# Patient Record
Sex: FEMALE | Race: WHITE | Employment: OTHER | URBAN - METROPOLITAN AREA
[De-identification: names, ages, dates, MRNs, and addresses within clinical notes are randomized per-mention and may not be internally consistent; named-entity substitution may affect disease eponyms.]

---

## 2022-06-02 NOTE — PROGRESS NOTES
ENDOSCOPY PREOP INSTRUCTIONS       You are scheduled for a procedure at [facility] on 6-3 @ 800.  You will need to arrival by: 630 (at least an hour & a half prior to planned start time)   Report to the MAIN entrance on [address] and register at the information desk on the left-hand side of the lobby   You will need your insurance & photo ID with you. For your procedure:      PLEASE FOLLOW ALL INSTRUCTIONS & PREPS GIVEN TO YOU FROM YOUR DOCTOR'S OFFICE.  If you have not received these instructions yet, please call the office immediately. Make sure to read them as soon as received.  Bring an accurate list of any medications, including the dose/ frequency, with you on the day of the procedure. Make sure to include over the counter medications.  If you are taking blood thinners, Aspirin or diabetic medication, make sure to call your doctor as soon as possible for instructions prior to your procedure.  Please dress comfortably and do not wear any lotion, powders or jewelry   Arrange for someone to be with you and sign you out & drive you home after your procedure.  We allow 2 visitors with you in the hospital & both of you are required to be masked.  WOMEN ONLY OF CHILDBEARING AGE: Please make sure to be able to give a urine sample on arrival      If you have further questions, you may contact your Endoscopist's office or Pre Admission Testing staff at [phone] [facility]. 6/2/2022 .3:09 PM

## 2022-06-03 ENCOUNTER — HOSPITAL ENCOUNTER (OUTPATIENT)
Age: 67
Setting detail: OUTPATIENT SURGERY
Discharge: HOME | End: 2022-06-03
Attending: INTERNAL MEDICINE | Admitting: INTERNAL MEDICINE
Payer: MEDICARE

## 2022-06-03 ENCOUNTER — ANESTHESIA EVENT (OUTPATIENT)
Dept: ENDOSCOPY | Age: 67
End: 2022-06-03
Payer: MEDICARE

## 2022-06-03 ENCOUNTER — ANESTHESIA (OUTPATIENT)
Dept: ENDOSCOPY | Age: 67
End: 2022-06-03
Payer: MEDICARE

## 2022-06-03 VITALS
TEMPERATURE: 96.9 F | RESPIRATION RATE: 18 BRPM | OXYGEN SATURATION: 99 % | HEART RATE: 65 BPM | HEIGHT: 65 IN | BODY MASS INDEX: 34.99 KG/M2 | WEIGHT: 210 LBS | SYSTOLIC BLOOD PRESSURE: 111 MMHG | DIASTOLIC BLOOD PRESSURE: 45 MMHG

## 2022-06-03 DIAGNOSIS — Z86.010 HISTORY OF COLON POLYPS: ICD-10-CM

## 2022-06-03 PROCEDURE — 3700000001 HC ADD 15 MINUTES (ANESTHESIA): Performed by: INTERNAL MEDICINE

## 2022-06-03 PROCEDURE — 7100000011 HC PHASE II RECOVERY - ADDTL 15 MIN: Performed by: INTERNAL MEDICINE

## 2022-06-03 PROCEDURE — 2580000003 HC RX 258: Performed by: ANESTHESIOLOGY

## 2022-06-03 PROCEDURE — 7100000010 HC PHASE II RECOVERY - FIRST 15 MIN: Performed by: INTERNAL MEDICINE

## 2022-06-03 PROCEDURE — 88305 TISSUE EXAM BY PATHOLOGIST: CPT

## 2022-06-03 PROCEDURE — 2709999900 HC NON-CHARGEABLE SUPPLY: Performed by: INTERNAL MEDICINE

## 2022-06-03 PROCEDURE — 6360000002 HC RX W HCPCS: Performed by: NURSE ANESTHETIST, CERTIFIED REGISTERED

## 2022-06-03 PROCEDURE — 3609010600 HC COLONOSCOPY POLYPECTOMY SNARE/COLD BIOPSY: Performed by: INTERNAL MEDICINE

## 2022-06-03 PROCEDURE — 3700000000 HC ANESTHESIA ATTENDED CARE: Performed by: INTERNAL MEDICINE

## 2022-06-03 RX ORDER — ASPIRIN 81 MG/1
81 TABLET ORAL DAILY
COMMUNITY
Start: 2018-05-22

## 2022-06-03 RX ORDER — DULOXETIN HYDROCHLORIDE 30 MG/1
CAPSULE, DELAYED RELEASE ORAL DAILY
COMMUNITY
Start: 2022-04-18

## 2022-06-03 RX ORDER — PROPOFOL 10 MG/ML
INJECTION, EMULSION INTRAVENOUS PRN
Status: DISCONTINUED | OUTPATIENT
Start: 2022-06-03 | End: 2022-06-03 | Stop reason: SDUPTHER

## 2022-06-03 RX ORDER — LIDOCAINE HYDROCHLORIDE 20 MG/ML
INJECTION, SOLUTION INTRAVENOUS PRN
Status: DISCONTINUED | OUTPATIENT
Start: 2022-06-03 | End: 2022-06-03 | Stop reason: SDUPTHER

## 2022-06-03 RX ORDER — SODIUM CHLORIDE 0.9 % (FLUSH) 0.9 %
5-40 SYRINGE (ML) INJECTION PRN
Status: DISCONTINUED | OUTPATIENT
Start: 2022-06-03 | End: 2022-06-03 | Stop reason: HOSPADM

## 2022-06-03 RX ORDER — SACUBITRIL AND VALSARTAN 49; 51 MG/1; MG/1
TABLET, FILM COATED ORAL DAILY
COMMUNITY
Start: 2021-04-09

## 2022-06-03 RX ORDER — LIDOCAINE HYDROCHLORIDE 10 MG/ML
1 INJECTION, SOLUTION EPIDURAL; INFILTRATION; INTRACAUDAL; PERINEURAL
Status: DISCONTINUED | OUTPATIENT
Start: 2022-06-03 | End: 2022-06-03 | Stop reason: HOSPADM

## 2022-06-03 RX ORDER — ACETAMINOPHEN 325 MG/1
650 TABLET ORAL EVERY 4 HOURS PRN
COMMUNITY
Start: 2017-05-03

## 2022-06-03 RX ORDER — SODIUM CHLORIDE 0.9 % (FLUSH) 0.9 %
5-40 SYRINGE (ML) INJECTION EVERY 12 HOURS SCHEDULED
Status: DISCONTINUED | OUTPATIENT
Start: 2022-06-03 | End: 2022-06-03 | Stop reason: HOSPADM

## 2022-06-03 RX ORDER — FUROSEMIDE 20 MG/1
TABLET ORAL
COMMUNITY
Start: 2021-07-06

## 2022-06-03 RX ORDER — NITROGLYCERIN 0.4 MG/1
TABLET SUBLINGUAL
COMMUNITY
Start: 2022-02-22

## 2022-06-03 RX ORDER — CYCLOSPORINE 0.5 MG/ML
1 EMULSION OPHTHALMIC EVERY MORNING
COMMUNITY

## 2022-06-03 RX ORDER — SODIUM CHLORIDE, SODIUM LACTATE, POTASSIUM CHLORIDE, CALCIUM CHLORIDE 600; 310; 30; 20 MG/100ML; MG/100ML; MG/100ML; MG/100ML
INJECTION, SOLUTION INTRAVENOUS CONTINUOUS
Status: DISCONTINUED | OUTPATIENT
Start: 2022-06-03 | End: 2022-06-03 | Stop reason: HOSPADM

## 2022-06-03 RX ORDER — M-VIT,TX,IRON,MINS/CALC/FOLIC 27MG-0.4MG
1 TABLET ORAL DAILY
COMMUNITY

## 2022-06-03 RX ORDER — SODIUM CHLORIDE 9 MG/ML
INJECTION, SOLUTION INTRAVENOUS PRN
Status: DISCONTINUED | OUTPATIENT
Start: 2022-06-03 | End: 2022-06-03 | Stop reason: HOSPADM

## 2022-06-03 RX ORDER — METOPROLOL SUCCINATE 100 MG/1
TABLET, EXTENDED RELEASE ORAL DAILY
COMMUNITY
Start: 2022-03-10

## 2022-06-03 RX ORDER — ROSUVASTATIN CALCIUM 5 MG/1
2.5 TABLET, COATED ORAL DAILY
COMMUNITY
Start: 2022-05-11

## 2022-06-03 RX ORDER — ALBUTEROL SULFATE 90 UG/1
2 AEROSOL, METERED RESPIRATORY (INHALATION) EVERY 6 HOURS PRN
COMMUNITY
Start: 2021-01-21

## 2022-06-03 RX ORDER — SPIRONOLACTONE 25 MG/1
TABLET ORAL DAILY
COMMUNITY
Start: 2022-03-10

## 2022-06-03 RX ADMIN — PROPOFOL 30 MG: 10 INJECTION, EMULSION INTRAVENOUS at 08:15

## 2022-06-03 RX ADMIN — SODIUM CHLORIDE, POTASSIUM CHLORIDE, SODIUM LACTATE AND CALCIUM CHLORIDE: 600; 310; 30; 20 INJECTION, SOLUTION INTRAVENOUS at 07:06

## 2022-06-03 RX ADMIN — PROPOFOL 40 MG: 10 INJECTION, EMULSION INTRAVENOUS at 08:19

## 2022-06-03 RX ADMIN — PROPOFOL 50 MG: 10 INJECTION, EMULSION INTRAVENOUS at 08:11

## 2022-06-03 RX ADMIN — LIDOCAINE HYDROCHLORIDE 60 MG: 20 INJECTION, SOLUTION INTRAVENOUS at 08:11

## 2022-06-03 RX ADMIN — PROPOFOL 30 MG: 10 INJECTION, EMULSION INTRAVENOUS at 08:24

## 2022-06-03 RX ADMIN — PROPOFOL 140 MCG/KG/MIN: 10 INJECTION, EMULSION INTRAVENOUS at 08:11

## 2022-06-03 ASSESSMENT — PAIN - FUNCTIONAL ASSESSMENT: PAIN_FUNCTIONAL_ASSESSMENT: 0-10

## 2022-06-03 ASSESSMENT — PAIN SCALES - GENERAL
PAINLEVEL_OUTOF10: 0

## 2022-06-03 NOTE — H&P
[facility] ENDOSCOPY  Outpatient Procedure H&P    Patient: Margaux Franklin MRN: [id number]     YOB: 1955  Age: 78 y.o. Sex: female    Unit: [facility] ENDOSCOPY Room/Bed: Endo Pool/NONE Location: [facility]     Procedure: Procedure(s):  COLONOSCOPY    Indication: History of colon polyps [Z86.010]    Referring  Physician:          Nurses past medical history notes reviewed and agreed. Medications reviewed.     Allergies: Latex, Colesevelam hcl, Polyethylene glycol, Ezetimibe, Lyrica [pregabalin], Penicillins, Rice, Flaxseed oil [bio-flax], Omega oil blend [docosahexaenoic acid (dha)], Percocet [oxycodone-acetaminophen], and Perflutren lipid microsphere     Allergies noted: Yes     Past Medical History:   Past Medical History:   Diagnosis Date    Agent orange exposure up to age 22    [place] Allergic rhinitis     Benign tumor of breast     Depression     Fibromyalgia     Heart attack ([facility] [address] 04/29/2017    Heart failure (HCC)     Meniere disease     Meniere's disease     Mitral valve prolapse     Rheumatoid arthritis(714.0)     Sinusitis     Vertigo        Past Surgical History:   Past Surgical History:   Procedure Laterality Date    CARDIAC DEFIBRILLATOR PLACEMENT Left     CORONARY ANGIOPLASTY WITH STENT PLACEMENT      one stent    HYSTERECTOMY  01/01/1991    KNEE ARTHROSCOPY  01/01/2008    right    KNEE ARTHROSCOPY Right 01/01/1996    MASTOID SURGERY  10/15/2004    Mastoid Shunt       Social History:   Social History     Socioeconomic History    Marital status:      Spouse name: Not on file    Number of children: Not on file    Years of education: Not on file    Highest education level: Not on file   Occupational History    Not on file   Tobacco Use    Smoking status: Current Every Day Smoker     Packs/day: 0.80     Years: 30.00     Pack years: 24.00     Types: Cigarettes    Smokeless tobacco: Never Used   Substance and Sexual Activity    Alcohol use: Yes     Alcohol/week: 3.3 standard drinks     Types: 4 drink(s) per week    Drug use: No    Sexual activity: Yes     Partners: Male   Other Topics Concern    Not on file   Social History Narrative    Not on file     Social Determinants of Health     Financial Resource Strain:     Difficulty of Paying Living Expenses: Not on file   Food Insecurity:     Worried About Running Out of Food in the Last Year: Not on file    Mohan of Food in the Last Year: Not on file   Transportation Needs:     Lack of Transportation (Medical): Not on file    Lack of Transportation (Non-Medical): Not on file   Physical Activity:     Days of Exercise per Week: Not on file    Minutes of Exercise per Session: Not on file   Stress:     Feeling of Stress : Not on file   Social Connections:     Frequency of Communication with Friends and Family: Not on file    Frequency of Social Gatherings with Friends and Family: Not on file    Attends Yazidism Services: Not on file    Active Member of [address] [place] [address] [employer] or Organizations: Not on file    Attends Club or Organization Meetings: Not on file    Marital Status: Not on file   Intimate Partner Violence:     Fear of Current or Ex-Partner: Not on file    Emotionally Abused: Not on file    Physically Abused: Not on file    Sexually Abused: Not on file   Housing Stability:     Unable to Pay for Housing in the Last Year: Not on file    Number of [place] in the Last Year: Not on file    Unstable Housing in the Last Year: Not on file       Family History:   Family History   Problem Relation Age of Onset    Heart Attack Father     Colon Cancer Mother     Uterine Cancer Maternal Grandmother     Breast Cancer Maternal Aunt        Home Medications:   Prior to Admission medications    Medication Sig Start Date End Date Taking?  Authorizing Provider   rosuvastatin (CRESTOR) 5 MG tablet Take 2.5 mg by mouth daily 5/11/22  Yes Historical Provider, MD   DULoxetine (CYMBALTA) 30 MG extended release capsule daily 4/18/22  Yes Historical Provider, MD   nitroGLYCERIN (NITROSTAT) 0.4 MG SL tablet TAKE 1 TABLET BY SUBLINGUAL ROUTE EVERY 5 MINUTES AS NEEDED. 2/22/22  Yes Historical Provider, MD   empagliflozin (JARDIANCE) 25 MG tablet Take 25 mg by mouth daily 8/27/21  Yes Historical Provider, MD   metoprolol succinate (TOPROL XL) 100 MG extended release tablet daily 3/10/22  Yes Historical Provider, MD   furosemide (LASIX) 20 MG tablet TAKE 1 TABLET BY MOUTH EVERY DAY 7/6/21  Yes Historical Provider, MD   spironolactone (ALDACTONE) 25 MG tablet daily 3/10/22  Yes Historical Provider, MD   albuterol sulfate  (90 Base) MCG/ACT inhaler Inhale 2 puffs into the lungs every 6 hours as needed 1/21/21  Yes Historical Provider, MD   cycloSPORINE (RESTASIS) 0.05 % ophthalmic emulsion Inject 1 drop into the eye every morning   Yes Historical Provider, MD   Magnesium Oxide (DIASENSE MAGNESIUM PO) Take 400 mg by mouth daily 5/8/19  Yes Historical Provider, MD   aspirin 81 MG EC tablet Take 81 mg by mouth daily 5/22/18  Yes Historical Provider, MD   Multiple Vitamins-Minerals (THERAPEUTIC MULTIVITAMIN-MINERALS) tablet Take 1 tablet by mouth daily   Yes Historical Provider, MD   acetaminophen (TYLENOL) 325 mg tablet Take 650 mg by mouth every 4 hours as needed 5/3/17  Yes Historical Provider, MD   sacubitril-valsartan (ENTRESTO) 49-51 MG per tablet daily 4/9/21  Yes Historical Provider, MD   vitamin D 25 MCG (1000 UT) CAPS Take 1 capsule by mouth daily   Yes Historical Provider, MD   BIOTIN PO Take by mouth daily   Yes Historical Provider, MD   Probiotic Product (PROBIOTIC PO) Take by mouth daily   Yes Historical Provider, MD   Levocetirizine Dihydrochloride (XYZAL PO) Take by mouth daily as needed   Yes Historical Provider, MD   omeprazole (PRILOSEC) 40 MG capsule TAKE 1 CAPSULE BY MOUTH DAILY.  10/19/15   Arvin Arboleda MD       Review of Systems:  Weight Loss: No  Dysphagia: No  Dyspepsia: No  Melena: no  Chest pain: no    Physical Exam:   Vital Signs: BP (!) 121/45   Pulse 65   Temp 97.6 °F (36.4 °C) (Temporal)   Resp 16   Ht 5' 5\" (1.651 m)   Wt 210 lb (95.3 kg)   SpO2 95%   BMI 34.95 kg/m²   Vital signs reviewed:Yes    HEENT:Normal  Cardiac:Normal  Chest:Normal  Abdomen:Normal  Exts: Normal  Neuro:Normal    Labs:  No visits with results within 12 Week(s) from this visit. Latest known visit with results is:   Office Visit on 02/05/2014   Component Date Value Ref Range Status    Hep B Core Total Ab 02/05/2014 Negative  Negative Final    Hep C Ab Interp 02/05/2014 Non-reactive  Non-reactive Final    Hep A Total Ab 02/05/2014 Negative  Negative Final    Hep B S Ab 02/05/2014 147.20  mUl/mL Final        Imaging:  No orders to display       ASA:2    Mallampati Score: II    Sedation planned:MAC    Patient in acceptable condition for procedure: Yes    8:02 AM 6/3/2022    Karen Norris MD      Please note that some or all of this record was generated using voice recognition software. If there are any questions about the content of this document, please contact the author as some errors in transcription may have occurred.

## 2022-06-03 NOTE — ANESTHESIA POSTPROCEDURE EVALUATION
Department of Anesthesiology  Postprocedure Note    Patient: Teresa Negron  MRN: [id number]  YOB: 1955  Date of evaluation: 6/3/2022  Time:  9:18 AM     Procedure Summary     Date: 06/03/22 Room / Location: [place] [address] / [facility]    Anesthesia Start: 6532 Anesthesia Stop: 5053    Procedure: COLONOSCOPY POLYPECTOMY SNARE/COLD BIOPSY Diagnosis:       History of colon polyps      (History of colon polyps [Z86.010])    Surgeons: Analy Martinez MD Responsible Provider: Celia Ridley MD    Anesthesia Type: general ASA Status: 3          Anesthesia Type: No value filed. Dana Phase I: Dana Score: 10    Dana Phase II: Dana Score: 10    Last vitals: Reviewed and per EMR flowsheets.        Anesthesia Post Evaluation    Patient location during evaluation: PACU  Patient participation: complete - patient participated  Level of consciousness: awake and alert  Pain score: 0  Airway patency: patent  Nausea & Vomiting: no nausea and no vomiting  Complications: no  Cardiovascular status: hemodynamically stable  Respiratory status: acceptable  Hydration status: euvolemic

## 2022-06-03 NOTE — OP NOTE
Colonoscopy Procedure Note    Patient: Yudelka Stephenson MRN: [id number]     YOB: 1955  Age: 78 y.o. Sex: female    Unit: [facility] ENDOSCOPY Room/Bed: Endo Pool/NONE Location: [address] [place] [address]       Colonoscopy with polypectomy (cold snare)    Admitting Physician: Sarai Andrews     Primary Care Physician: Heri Askew      Preoperative Diagnosis: History of colon polyps [Z86.010]      DATE OF OPERATION: 6/3/2022    OPERATIVE SURGEON: Zahraa Matthews MD      ANESTHESIA: Monitor Anesthesia Care      Procedure Details:    After informed consent was obtained with all risks and benefits of procedure explained and preoperative exam completed, the patient was taken to the endoscopy suite and placed in the left lateral decubitus position. Upon sequential sedation as per above, a digital rectal exam was performed and was normal.  The Olympus videocolonoscope  was inserted in the rectum and carefully advanced to the cecum. Cecum Intubated : yes. The quality of preparation was good. The colonoscope was slowly withdrawn with careful evaluation between folds. Retroflexion in the rectum was performed. Estimated Blood Loss: minimal    Complications:  none    Findings:   hemorrhoids internal, Moderate in size  polyp(s) #1, 4 mm in size, located in the ascending colon removed by cold snare and retrieved for pathology  #2, 5 mm in size, located in the transverse colon removed by cold snare and retrieved for pathology  #3, 4 mm in size, located in the transverse colon removed by cold snare and retrieved for pathology  #4, 5 mm in size, located in the transverse colon removed by cold snare and retrieved for pathology  #5, 3 mm in size, located in the descending colon removed by cold snare, but not retrieved for pathology    Plan: Await pathology results. Repeat colonoscopy in 3 years.         Signed By: Zahraa Matthews MD

## 2022-06-03 NOTE — PROGRESS NOTES
Ambulatory Surgery/Procedure Discharge Note    Vitals:    06/03/22 0850   BP: 104/72   Pulse: 67   Resp: 18   Temp: 96.9 °F (36.1 °C)   SpO2: 99%       In: 500 [I.V.:500]  Out: -     Restroom use offered before discharge. Yes    Pain assessment:  none  Pain Level: 0    Patient is alert and oriented, speech clear, respirations easy and non-labored, no distress noted. Patient denies pain or nausea, tolerating po fluids without issue. Education completed with patient and spouse, both verbalized understanding of instructions and follow-up. Pt to restroom prior to discharge. Spouse spoke with Dr. Karilyn Pallas via telephone following procedure. Patient discharged to home/self care.  Patient discharged via wheel chair by transporter to waiting family/S.O.       6/3/2022 8:59 AM

## 2022-06-03 NOTE — ANESTHESIA PRE PROCEDURE
Department of Anesthesiology  Preprocedure Note       Name:  Sydnie Kellogg   Age:  78 y.o.  :  1955                                          MRN:  [id number]         Date:  6/3/2022      Surgeon: Luis Armando Oreilly):  Mariella Tyler MD    Procedure: Procedure(s):  COLONOSCOPY    Medications prior to admission:   Prior to Admission medications    Medication Sig Start Date End Date Taking?  Authorizing Provider   rosuvastatin (CRESTOR) 5 MG tablet Take 2.5 mg by mouth daily 22  Yes Historical Provider, MD   DULoxetine (CYMBALTA) 30 MG extended release capsule daily 22  Yes Historical Provider, MD   nitroGLYCERIN (NITROSTAT) 0.4 MG SL tablet TAKE 1 TABLET BY SUBLINGUAL ROUTE EVERY 5 MINUTES AS NEEDED. 22  Yes Historical Provider, MD   empagliflozin (JARDIANCE) 25 MG tablet Take 25 mg by mouth daily 21  Yes Historical Provider, MD   metoprolol succinate (TOPROL XL) 100 MG extended release tablet daily 3/10/22  Yes Historical Provider, MD   furosemide (LASIX) 20 MG tablet TAKE 1 TABLET BY MOUTH EVERY DAY 21  Yes Historical Provider, MD   spironolactone (ALDACTONE) 25 MG tablet daily 3/10/22  Yes Historical Provider, MD   albuterol sulfate  (90 Base) MCG/ACT inhaler Inhale 2 puffs into the lungs every 6 hours as needed 21  Yes Historical Provider, MD   cycloSPORINE (RESTASIS) 0.05 % ophthalmic emulsion Inject 1 drop into the eye every morning   Yes Historical Provider, MD   Magnesium Oxide (DIASENSE MAGNESIUM PO) Take 400 mg by mouth daily 19  Yes Historical Provider, MD   aspirin 81 MG EC tablet Take 81 mg by mouth daily 18  Yes Historical Provider, MD   Multiple Vitamins-Minerals (THERAPEUTIC MULTIVITAMIN-MINERALS) tablet Take 1 tablet by mouth daily   Yes Historical Provider, MD   acetaminophen (TYLENOL) 325 mg tablet Take 650 mg by mouth every 4 hours as needed 5/3/17  Yes Historical Provider, MD   sacubitril-valsartan (ENTRESTO) 49-51 MG per tablet daily 21  Yes Historical Provider, MD   vitamin D 25 MCG (1000 UT) CAPS Take 1 capsule by mouth daily   Yes Historical Provider, MD   BIOTIN PO Take by mouth daily   Yes Historical Provider, MD   Probiotic Product (PROBIOTIC PO) Take by mouth daily   Yes Historical Provider, MD   Levocetirizine Dihydrochloride (XYZAL PO) Take by mouth daily as needed   Yes Historical Provider, MD   omeprazole (PRILOSEC) 40 MG capsule TAKE 1 CAPSULE BY MOUTH DAILY. 10/19/15   Selestino Dancer, MD       Current medications:    Current Facility-Administered Medications   Medication Dose Route Frequency Provider Last Rate Last Admin    lidocaine PF 1 % injection 1 mL  1 mL IntraDERmal Once PRN Ynes Aguiar MD        lactated ringers infusion   IntraVENous Continuous Ynes Aguiar  mL/hr at 06/03/22 0706 New Bag at 06/03/22 0706    sodium chloride flush 0.9 % injection 5-40 mL  5-40 mL IntraVENous 2 times per day Ynes Aguiar MD        sodium chloride flush 0.9 % injection 5-40 mL  5-40 mL IntraVENous PRN Ynes Aguiar MD        0.9 % sodium chloride infusion   IntraVENous PRN Ynes Aguiar MD           Allergies: Allergies   Allergen Reactions    Latex Rash and Swelling     Redness.   Needs Electrodes latex free      Colesevelam Hcl Hives     Other reaction(s): Flushing    Polyethylene Glycol Hives    Ezetimibe      Other reaction(s): Muscle Aches    Lyrica [Pregabalin] Other (See Comments)     jittery    Penicillins     Rice Other (See Comments)     Gastric disturbance    Flaxseed Oil [Bio-Flax] Rash    Omega Oil Blend [Docosahexaenoic Acid (Dha)] Rash    Percocet [Oxycodone-Acetaminophen] Nausea And Vomiting    Perflutren Lipid Microsphere      Other reaction(s): Light Headed       Problem List:    Patient Active Problem List   Diagnosis Code    Sleepiness R40.0    Anxiety F41.9    Fibromyalgia M79.7    Obstructive sleep apnea G47.33    Hip arthritis M16.10    Allergic rhinitis J30.9       Past Medical History:        Diagnosis Date    Agent orange exposure up to age 22    [place] the [place] Allergic rhinitis     Benign tumor of breast     Depression     Fibromyalgia     [facility] ([facility] [address] 04/29/2017    Heart failure ([facility] [address]     Meniere disease     Meniere's disease     Mitral valve prolapse     Rheumatoid arthritis(714.0)     Sinusitis     Vertigo        Past Surgical History:        Procedure Laterality Date    CARDIAC DEFIBRILLATOR PLACEMENT Left     CORONARY ANGIOPLASTY WITH STENT PLACEMENT      one stent    HYSTERECTOMY  01/01/1991    KNEE ARTHROSCOPY  01/01/2008    right    KNEE ARTHROSCOPY Right 01/01/1996    MASTOID SURGERY  10/15/2004    Mastoid Shunt       Social History:    Social History     Tobacco Use    Smoking status: Current Every Day Smoker     Packs/day: 0.80     Years: 30.00     Pack years: 24.00     Types: Cigarettes    Smokeless tobacco: Never Used   Substance Use Topics    Alcohol use: Yes     Alcohol/week: 3.3 standard drinks     Types: 4 drink(s) per week                                Ready to quit: Not Answered  Counseling given: Not Answered      Vital Signs (Current):   Vitals:    06/03/22 0627   BP: (!) 121/45   Pulse: 65   Resp: 16   Temp: 97.6 °F (36.4 °C)   TempSrc: Temporal   SpO2: 95%   Weight: 210 lb (95.3 kg)   Height: 5' 5\" (1.651 m)                                              BP Readings from Last 3 Encounters:   06/03/22 (!) 121/45   04/21/15 118/70   10/01/14 100/52       NPO Status: Time of last liquid consumption: 0100                        Time of last solid consumption: 0800 (scrambled eggs/toast)                        Date of last liquid consumption: 06/03/22                        Date of last solid food consumption: 06/02/22    BMI:   Wt Readings from Last 3 Encounters:   06/03/22 210 lb (95.3 kg)   04/21/15 175 lb (79.4 kg)   10/01/14 180 lb (81.6 kg)     Body mass index is 34.95 kg/m².     CBC:   Lab Results   Component Value Date    WBC 9.3 01/29/2013    RBC 4.43 01/29/2013    HGB 14.3 01/29/2013    HCT 42.6 01/29/2013    MCV 96.2 01/29/2013    RDW 12.8 01/29/2013     01/29/2013       CMP:   Lab Results   Component Value Date     01/29/2013    K 4.6 01/29/2013     01/29/2013    CO2 30 01/29/2013    BUN 13 01/29/2013    CREATININE 0.6 01/29/2013    GFRAA >60 01/29/2013    GLUCOSE 86 01/29/2013    PROT 6.3 01/29/2013    CALCIUM 9.5 01/29/2013    BILITOT 0.50 01/29/2013    ALKPHOS 82 01/29/2013    AST 27 01/29/2013    ALT 31 01/29/2013       POC Tests: No results for input(s): POCGLU, POCNA, POCK, POCCL, POCBUN, POCHEMO, POCHCT in the last 72 hours. Coags: No results found for: PROTIME, INR, APTT    HCG (If Applicable): No results found for: PREGTESTUR, PREGSERUM, HCG, HCGQUANT     ABGs: No results found for: PHART, PO2ART, BPT6BNA, SWW7QFH, BEART, K8YPIMNZ     Type & Screen (If Applicable):  No results found for: LABABO, LABRH    Drug/Infectious Status (If Applicable):  No results found for: HIV, HEPCAB    COVID-19 Screening (If Applicable): No results found for: COVID19        Anesthesia Evaluation  Patient summary reviewed and Nursing notes reviewed no history of anesthetic complications:   Airway: Mallampati: II  TM distance: >3 FB   Neck ROM: full  Mouth opening: > = 3 FB   Dental:          Pulmonary:   (+) sleep apnea:                             Cardiovascular:    (+) valvular problems/murmurs: MVP, pacemaker: AICD, past MI: > 6 months,                   Neuro/Psych:   (+) psychiatric history:depression/anxiety             GI/Hepatic/Renal:             Endo/Other:    (+) : arthritis: rheumatoid. , .                 Abdominal:             Vascular: Other Findings:           Anesthesia Plan      general     ASA 1    (59-year-old female presents for colonoscopy. Plan general anesthesia with ASA standard monitors. Questions answered.   Patient agreeable with anesthetic plan.  )  Induction: intravenous. Anesthetic plan and risks discussed with patient. Plan discussed with CRNA.     Attending anesthesiologist reviewed and agrees with Sarah Bennett MD   6/3/2022

## (undated) DEVICE — TRAP POLYP ETRAP

## (undated) DEVICE — CANNULA SAMP CO2 AD GRN 7FT CO2 AND 7FT O2 TBNG UNIV CONN

## (undated) DEVICE — SNARE COLD DIAMOND 10MM THIN